# Patient Record
Sex: MALE | Race: WHITE | Employment: FULL TIME | URBAN - METROPOLITAN AREA
[De-identification: names, ages, dates, MRNs, and addresses within clinical notes are randomized per-mention and may not be internally consistent; named-entity substitution may affect disease eponyms.]

---

## 2024-07-14 ENCOUNTER — HOSPITAL ENCOUNTER (EMERGENCY)
Facility: HOSPITAL | Age: 44
Discharge: HOME | End: 2024-07-14
Attending: EMERGENCY MEDICINE
Payer: COMMERCIAL

## 2024-07-14 ENCOUNTER — APPOINTMENT (OUTPATIENT)
Dept: GENERAL RADIOLOGY | Facility: HOSPITAL | Age: 44
End: 2024-07-14
Payer: COMMERCIAL

## 2024-07-14 ENCOUNTER — APPOINTMENT (OUTPATIENT)
Dept: ULTRASOUND IMAGING | Facility: HOSPITAL | Age: 44
End: 2024-07-14
Attending: EMERGENCY MEDICINE
Payer: COMMERCIAL

## 2024-07-14 VITALS
DIASTOLIC BLOOD PRESSURE: 70 MMHG | OXYGEN SATURATION: 100 % | HEIGHT: 76 IN | HEART RATE: 57 BPM | SYSTOLIC BLOOD PRESSURE: 102 MMHG | RESPIRATION RATE: 18 BRPM | BODY MASS INDEX: 29.22 KG/M2 | WEIGHT: 240 LBS | TEMPERATURE: 98 F

## 2024-07-14 DIAGNOSIS — K80.20 CALCULUS OF GALLBLADDER WITHOUT CHOLECYSTITIS WITHOUT OBSTRUCTION: Primary | ICD-10-CM

## 2024-07-14 LAB
ALBUMIN SERPL-MCNC: 4.1 G/DL (ref 3.4–5)
ALBUMIN/GLOB SERPL: 1.1 {RATIO} (ref 1–2)
ALP LIVER SERPL-CCNC: 50 U/L
ALT SERPL-CCNC: 26 U/L
ANION GAP SERPL CALC-SCNC: 8 MMOL/L (ref 0–18)
AST SERPL-CCNC: 12 U/L (ref 15–37)
ATRIAL RATE: 60 BPM
ATRIAL RATE: 66 BPM
BASOPHILS # BLD AUTO: 0.03 X10(3) UL (ref 0–0.2)
BASOPHILS NFR BLD AUTO: 0.3 %
BILIRUB SERPL-MCNC: 0.4 MG/DL (ref 0.1–2)
BUN BLD-MCNC: 14 MG/DL (ref 9–23)
CALCIUM BLD-MCNC: 8.9 MG/DL (ref 8.5–10.1)
CHLORIDE SERPL-SCNC: 109 MMOL/L (ref 98–112)
CO2 SERPL-SCNC: 26 MMOL/L (ref 21–32)
CREAT BLD-MCNC: 1.21 MG/DL
EGFRCR SERPLBLD CKD-EPI 2021: 75 ML/MIN/1.73M2 (ref 60–?)
EOSINOPHIL # BLD AUTO: 0.15 X10(3) UL (ref 0–0.7)
EOSINOPHIL NFR BLD AUTO: 1.6 %
ERYTHROCYTE [DISTWIDTH] IN BLOOD BY AUTOMATED COUNT: 13.5 %
GLOBULIN PLAS-MCNC: 3.7 G/DL (ref 2.8–4.4)
GLUCOSE BLD-MCNC: 69 MG/DL (ref 70–99)
GLUCOSE BLD-MCNC: 90 MG/DL (ref 70–99)
HCT VFR BLD AUTO: 43.1 %
HGB BLD-MCNC: 13.9 G/DL
IMM GRANULOCYTES # BLD AUTO: 0.02 X10(3) UL (ref 0–1)
IMM GRANULOCYTES NFR BLD: 0.2 %
LIPASE SERPL-CCNC: 247 U/L (ref 13–75)
LYMPHOCYTES # BLD AUTO: 4.36 X10(3) UL (ref 1–4)
LYMPHOCYTES NFR BLD AUTO: 46.6 %
MCH RBC QN AUTO: 27.7 PG (ref 26–34)
MCHC RBC AUTO-ENTMCNC: 32.3 G/DL (ref 31–37)
MCV RBC AUTO: 86 FL
MONOCYTES # BLD AUTO: 0.84 X10(3) UL (ref 0.1–1)
MONOCYTES NFR BLD AUTO: 9 %
NEUTROPHILS # BLD AUTO: 3.96 X10 (3) UL (ref 1.5–7.7)
NEUTROPHILS # BLD AUTO: 3.96 X10(3) UL (ref 1.5–7.7)
NEUTROPHILS NFR BLD AUTO: 42.3 %
OSMOLALITY SERPL CALC.SUM OF ELEC: 295 MOSM/KG (ref 275–295)
P AXIS: 64 DEGREES
P AXIS: 70 DEGREES
P-R INTERVAL: 178 MS
P-R INTERVAL: 184 MS
PLATELET # BLD AUTO: 202 10(3)UL (ref 150–450)
POTASSIUM SERPL-SCNC: 4 MMOL/L (ref 3.5–5.1)
PROT SERPL-MCNC: 7.8 G/DL (ref 6.4–8.2)
Q-T INTERVAL: 408 MS
Q-T INTERVAL: 428 MS
QRS DURATION: 102 MS
QRS DURATION: 104 MS
QTC CALCULATION (BEZET): 408 MS
QTC CALCULATION (BEZET): 448 MS
R AXIS: 98 DEGREES
R AXIS: 99 DEGREES
RBC # BLD AUTO: 5.01 X10(6)UL
SODIUM SERPL-SCNC: 143 MMOL/L (ref 136–145)
T AXIS: 48 DEGREES
T AXIS: 53 DEGREES
TROPONIN I SERPL HS-MCNC: <3 NG/L
TROPONIN I SERPL HS-MCNC: <3 NG/L
VENTRICULAR RATE: 60 BPM
VENTRICULAR RATE: 66 BPM
WBC # BLD AUTO: 9.4 X10(3) UL (ref 4–11)

## 2024-07-14 PROCEDURE — 85025 COMPLETE CBC W/AUTO DIFF WBC: CPT | Performed by: EMERGENCY MEDICINE

## 2024-07-14 PROCEDURE — 82962 GLUCOSE BLOOD TEST: CPT

## 2024-07-14 PROCEDURE — 36415 COLL VENOUS BLD VENIPUNCTURE: CPT

## 2024-07-14 PROCEDURE — 99285 EMERGENCY DEPT VISIT HI MDM: CPT

## 2024-07-14 PROCEDURE — 71045 X-RAY EXAM CHEST 1 VIEW: CPT

## 2024-07-14 PROCEDURE — 84484 ASSAY OF TROPONIN QUANT: CPT | Performed by: EMERGENCY MEDICINE

## 2024-07-14 PROCEDURE — 93010 ELECTROCARDIOGRAM REPORT: CPT

## 2024-07-14 PROCEDURE — 84484 ASSAY OF TROPONIN QUANT: CPT

## 2024-07-14 PROCEDURE — 80053 COMPREHEN METABOLIC PANEL: CPT

## 2024-07-14 PROCEDURE — 83690 ASSAY OF LIPASE: CPT | Performed by: EMERGENCY MEDICINE

## 2024-07-14 PROCEDURE — 80053 COMPREHEN METABOLIC PANEL: CPT | Performed by: EMERGENCY MEDICINE

## 2024-07-14 PROCEDURE — 76700 US EXAM ABDOM COMPLETE: CPT | Performed by: EMERGENCY MEDICINE

## 2024-07-14 PROCEDURE — 85025 COMPLETE CBC W/AUTO DIFF WBC: CPT

## 2024-07-14 PROCEDURE — 93005 ELECTROCARDIOGRAM TRACING: CPT

## 2024-07-14 RX ORDER — MAGNESIUM HYDROXIDE/ALUMINUM HYDROXICE/SIMETHICONE 120; 1200; 1200 MG/30ML; MG/30ML; MG/30ML
30 SUSPENSION ORAL ONCE
Status: COMPLETED | OUTPATIENT
Start: 2024-07-14 | End: 2024-07-14

## 2024-07-14 RX ORDER — NITROGLYCERIN 0.4 MG/1
0.4 TABLET SUBLINGUAL ONCE
Status: COMPLETED | OUTPATIENT
Start: 2024-07-14 | End: 2024-07-14

## 2024-07-14 RX ORDER — ASPIRIN 81 MG/1
324 TABLET, CHEWABLE ORAL ONCE
Status: COMPLETED | OUTPATIENT
Start: 2024-07-14 | End: 2024-07-14

## 2024-07-14 NOTE — DISCHARGE INSTRUCTIONS
Clear liquids for the next 12 to 24 hours and advance diet to low-fat diet avoid fatty fried or greasy foods.  Follow-up with duly general surgery over the next couple of days call tomorrow morning.  If you have severe pain high fever or excessive vomiting you should return to the ER.

## 2024-07-14 NOTE — ED PROVIDER NOTES
Patient Seen in: [facility] Emergency Department      History     Chief Complaint   Patient presents with    Chest Pain Angina     Stated Complaint: chest pain    Subjective:   HPI    This is a 44-year-old male complaining of abdominal pain the patient points to his lower chest upper abdomen somewhat epigastric area this started about 2 hours before coming in he states he had some salmon last night but also had 3 alcoholic drinks.  Woke up with some mild discomfort was unable to go back to sleep.  Pain does not radiate anywhere is not worsening with deep breath no nausea vomiting diaphoresis he is also had some intermittent pain in his right upper arm over the past month or so does not seem to be related to this pain and is seem to be improving that seem to worsen with movement.  There is no numbness or tingling.  Patient is non-smoking denies hypertension diabetes hypercholesterolemia or family history of heart disease.    Objective:   Past Medical History:    Anosmia    1 month after concussion    Concussion    Dust allergy    HEMORRHOIDS              Past Surgical History:   Procedure Laterality Date    Eye surgery      lasik                Social History     Socioeconomic History    Marital status:    Tobacco Use    Smoking status: Never    Smokeless tobacco: Never   Vaping Use    Vaping status: Never Used   Substance and Sexual Activity    Alcohol use: Yes     Comment: rare, 1/month    Drug use: No    Sexual activity: Yes     Partners: Female              Review of Systems    Positive for stated Chief Complaint: Chest Pain Angina    Other systems are as noted in HPI.  Constitutional and vital signs reviewed.      All other systems reviewed and negative except as noted above.    Physical Exam     ED Triage Vitals   BP 07/14/24 0428 130/86   Pulse 07/14/24 0428 73   Resp 07/14/24 0428 18   Temp 07/14/24 0436 97.6 °F (36.4 °C)   Temp src 07/14/24 0436 Temporal   SpO2 07/14/24 0428 100 %   O2 Device  07/14/24 0608 None (Room air)       Current Vitals:   Vital Signs  BP: 102/70  Pulse: 57  Resp: 18  Temp: 97.6 °F (36.4 °C)  Temp src: Temporal  MAP (mmHg): 81    Oxygen Therapy  SpO2: 100 %  O2 Device: None (Room air)            Physical Exam    Alert and oriented ×3 in no acute distress.  HEENT exam within normal limits.  Neck supple without lymphadenopathy or JVD.  Lungs are clear to auscultation.  Cardiovascular exam regular rate and rhythm without murmurs.  Abdomen is soft and nontender without rebound or guarding.  Extremities no clubbing cyanosis or edema.  Skin there is no rash.  Neurologic exam is within normal limits no focal deficits.    ED Course     Labs Reviewed   COMP METABOLIC PANEL (14) - Abnormal; Notable for the following components:       Result Value    Glucose 69 (*)     AST 12 (*)     All other components within normal limits   LIPASE - Abnormal; Notable for the following components:    Lipase 247 (*)     All other components within normal limits   CBC W/ DIFFERENTIAL - Abnormal; Notable for the following components:    Lymphocyte Absolute 4.36 (*)     All other components within normal limits   TROPONIN I HIGH SENSITIVITY - Normal   TROPONIN I HIGH SENSITIVITY - Normal   POCT GLUCOSE - Normal   CBC WITH DIFFERENTIAL WITH PLATELET    Narrative:     The following orders were created for panel order CBC With Differential With Platelet.  Procedure                               Abnormality         Status                     ---------                               -----------         ------                     CBC W/ DIFFERENTIAL[[id number]]          Abnormal            Final result                 Please view results for these tests on the individual orders.   RAINBOW DRAW BLUE     EKG    Rate, intervals and axes as noted on EKG Report.  Rate: 60  Rhythm: Sinus Rhythm  Reading: Rightward axis low voltage QRS cannot rule out anterior infarct age undetermined this is an abnormal EKG         Repeat  EKG done at 6:12 AM shows a ventricular rate of 66 the right axis and interval reviewed there is a normal sinus rhythm rightward axis low voltage QRS cannot rule out anterior infarct age undetermined this is an abnormal EKG   On the chemistries glucose was 69 but on repeat Accu-Chek it was 90     US ABDOMEN COMPLETE (CPT=76700)    Result Date: 7/14/2024  CONCLUSION:   1. Cholelithiasis and gallbladder sludge without sonographic evidence of acute cholecystitis or biliary obstruction.  2. Mild hepatic steatosis.   3. Normal appearance of the kidneys.  Additional abdominal viscera and aorta/IVC obscured by regional bowel gas.   LOCATION:  [facility]    Dictated by (CST): Rodríguez Torrez MD on 7/14/2024 at 8:07 AM     Finalized by (CST): Rodríguez Torrez MD on 7/14/2024 at 8:10 AM       XR CHEST AP PORTABLE  (CPT=71045)    Result Date: 7/14/2024  CONCLUSION:   Normal cardiac and mediastinal contours.  No pulmonary edema or focal airspace consolidation.  The pleural spaces are clear.  Regional osseous structures are normal.    LOCATION:  [facility]      Dictated by (CST): Rodríguez Torrez MD on 7/14/2024 at 7:16 AM     Finalized by (CST): Rodríguez Torrez MD on 7/14/2024 at 7:17 AM      Images independently reviewed there is cholelithiasis mild hepatic steatosis there is no cholecystitis chest x-ray unremarkable.         MDM      Initial differential diagnosis includes but not limited to ACS pulmonary embolus pancreatitis cholelithiasis gastritis GERD musculoskeletal chest pain pleurisy    Patient has cholelithiasis appears well can be discharged home discussed with general surgery and Dr. Ryan Erazo and patient was given referral advised low-fat diet return if worse                               Medical Decision Making      Disposition and Plan     Clinical Impression:  1. Calculus of gallbladder without cholecystitis without obstruction         Disposition:  Discharge  7/14/2024  8:30 am    Follow-up:  Luis Ballard  [address] [facility]  [address]  [facility] [address]  [phone]    Follow up      King Erazo MD  [address]  [address]  [facility] [id number] [phone]    Follow up in 2 day(s)            Medications Prescribed:  Current Discharge Medication List

## 2024-11-01 ENCOUNTER — HOSPITAL ENCOUNTER (EMERGENCY)
Facility: HOSPITAL | Age: 44
Discharge: HOME | End: 2024-11-01
Attending: EMERGENCY MEDICINE
Payer: COMMERCIAL

## 2024-11-01 VITALS
HEART RATE: 49 BPM | HEIGHT: 75 IN | SYSTOLIC BLOOD PRESSURE: 107 MMHG | TEMPERATURE: 97 F | BODY MASS INDEX: 26.98 KG/M2 | RESPIRATION RATE: 13 BRPM | DIASTOLIC BLOOD PRESSURE: 66 MMHG | OXYGEN SATURATION: 100 % | WEIGHT: 217 LBS

## 2024-11-01 DIAGNOSIS — G89.18 POSTOPERATIVE PAIN: Primary | ICD-10-CM

## 2024-11-01 LAB
ALBUMIN SERPL-MCNC: 4.2 G/DL (ref 3.2–4.8)
ALBUMIN/GLOB SERPL: 1.3 {RATIO} (ref 1–2)
ALP LIVER SERPL-CCNC: 58 U/L
ALT SERPL-CCNC: 57 U/L
ANION GAP SERPL CALC-SCNC: 8 MMOL/L (ref 0–18)
AST SERPL-CCNC: 63 U/L (ref ?–34)
BASOPHILS # BLD AUTO: 0.01 X10(3) UL (ref 0–0.2)
BASOPHILS NFR BLD AUTO: 0.1 %
BILIRUB SERPL-MCNC: 0.6 MG/DL (ref 0.3–1.2)
BUN BLD-MCNC: 11 MG/DL (ref 9–23)
CALCIUM BLD-MCNC: 9.7 MG/DL (ref 8.7–10.4)
CHLORIDE SERPL-SCNC: 110 MMOL/L (ref 98–112)
CO2 SERPL-SCNC: 23 MMOL/L (ref 21–32)
CREAT BLD-MCNC: 1.1 MG/DL
EGFRCR SERPLBLD CKD-EPI 2021: 84 ML/MIN/1.73M2 (ref 60–?)
EOSINOPHIL # BLD AUTO: 0 X10(3) UL (ref 0–0.7)
EOSINOPHIL NFR BLD AUTO: 0 %
ERYTHROCYTE [DISTWIDTH] IN BLOOD BY AUTOMATED COUNT: 13.4 %
GLOBULIN PLAS-MCNC: 3.2 G/DL (ref 2–3.5)
GLUCOSE BLD-MCNC: 150 MG/DL (ref 70–99)
HCT VFR BLD AUTO: 42.8 %
HGB BLD-MCNC: 14 G/DL
IMM GRANULOCYTES # BLD AUTO: 0.04 X10(3) UL (ref 0–1)
IMM GRANULOCYTES NFR BLD: 0.3 %
LYMPHOCYTES # BLD AUTO: 0.61 X10(3) UL (ref 1–4)
LYMPHOCYTES NFR BLD AUTO: 5.2 %
MCH RBC QN AUTO: 27.5 PG (ref 26–34)
MCHC RBC AUTO-ENTMCNC: 32.7 G/DL (ref 31–37)
MCV RBC AUTO: 83.9 FL
MONOCYTES # BLD AUTO: 0.24 X10(3) UL (ref 0.1–1)
MONOCYTES NFR BLD AUTO: 2 %
NEUTROPHILS # BLD AUTO: 10.89 X10 (3) UL (ref 1.5–7.7)
NEUTROPHILS # BLD AUTO: 10.89 X10(3) UL (ref 1.5–7.7)
NEUTROPHILS NFR BLD AUTO: 92.4 %
OSMOLALITY SERPL CALC.SUM OF ELEC: 294 MOSM/KG (ref 275–295)
PLATELET # BLD AUTO: 175 10(3)UL (ref 150–450)
POTASSIUM SERPL-SCNC: 3.7 MMOL/L (ref 3.5–5.1)
PROT SERPL-MCNC: 7.4 G/DL (ref 5.7–8.2)
RBC # BLD AUTO: 5.1 X10(6)UL
SODIUM SERPL-SCNC: 141 MMOL/L (ref 136–145)
WBC # BLD AUTO: 11.8 X10(3) UL (ref 4–11)

## 2024-11-01 PROCEDURE — 93005 ELECTROCARDIOGRAM TRACING: CPT

## 2024-11-01 PROCEDURE — 80053 COMPREHEN METABOLIC PANEL: CPT

## 2024-11-01 PROCEDURE — 93010 ELECTROCARDIOGRAM REPORT: CPT

## 2024-11-01 PROCEDURE — 36415 COLL VENOUS BLD VENIPUNCTURE: CPT

## 2024-11-01 PROCEDURE — 80053 COMPREHEN METABOLIC PANEL: CPT | Performed by: EMERGENCY MEDICINE

## 2024-11-01 PROCEDURE — 99284 EMERGENCY DEPT VISIT MOD MDM: CPT

## 2024-11-01 PROCEDURE — 85025 COMPLETE CBC W/AUTO DIFF WBC: CPT | Performed by: EMERGENCY MEDICINE

## 2024-11-01 PROCEDURE — 85025 COMPLETE CBC W/AUTO DIFF WBC: CPT

## 2024-11-01 RX ORDER — TRAMADOL HYDROCHLORIDE 50 MG/1
TABLET ORAL EVERY 6 HOURS PRN
Qty: 20 TABLET | Refills: 0 | Status: SHIPPED | OUTPATIENT
Start: 2024-11-01 | End: 2024-11-06

## 2024-11-02 NOTE — ED PROVIDER NOTES
Patient Seen in: [facility] Emergency Department      History     Chief Complaint   Patient presents with    Abdomen/Flank Pain     Stated Complaint: surgery today dr bennett young's admission    Subjective:   HPI      44-year-old male complaint of abdominal pain the patient had cholecystectomy and hernia repair done earlier today finished about 2 PM.  However he remained in recovery until the  surgical center was closing at 5 he continued to have pain of 8 out of 10 did get some fentanyl he said when he had that he was nauseated and his heart rate dropped in the 30s which has happened to him in the past.  He did vomit and felt somewhat better upon arrival here his pain is improved significantly states in a 1 or 2 on a 10 scale this was in the right upper quadrant.    Objective:     Past Medical History:    Anosmia    1 month after concussion    Concussion    Dust allergy    HEMORRHOIDS              Past Surgical History:   Procedure Laterality Date    Eye surgery      lasik    Hernia repair      Removal gallbladder                  Social History     Socioeconomic History    Marital status:    Tobacco Use    Smoking status: Never    Smokeless tobacco: Never   Vaping Use    Vaping status: Never Used   Substance and Sexual Activity    Alcohol use: Yes     Comment: rare, 1/month    Drug use: No    Sexual activity: Yes     Partners: Female     Social Drivers of Health     Food Insecurity: No Food Insecurity (8/5/2024)    Received from [facility]    Food Insecurity     Currently or in the past 3 months, have you worried your food would run out before you had money to buy more?: No     In the past 12 months, have you run out of food or been unable to get more?: No   Transportation Needs: No Transportation Needs (8/5/2024)    Received from [facility]    Transportation Needs     Medical Transportation Needs?: No    Received from [facility]    Housing  Stability                  Physical Exam     ED Triage Vitals   BP 11/01/24 1919 109/76   Pulse 11/01/24 1919 (!) 44   Resp 11/01/24 1919 16   Temp 11/01/24 1919 96.9 °F (36.1 °C)   Temp src 11/01/24 1919 Temporal   SpO2 11/01/24 1919 100 %   O2 Device 11/01/24 2014 None (Room air)       Current Vitals:   Vital Signs  BP: 107/66  Pulse: (!) 49  Resp: 13  Temp: 96.9 °F (36.1 °C)  Temp src: Temporal  MAP (mmHg): 78    Oxygen Therapy  SpO2: 100 %  O2 Device: None (Room air)        Physical Exam  Patient is alert orient x 3 no acute distress HEENT exam within normal limits neck there is no lymphadenopathy or JVD lungs are clear cardiovascular exam shows regular rate and rhythm without murmurs abdomen soft there is mild right upper quadrant tenderness no masses guarding rebound extremities normal skin is no rash    ED Course     Labs Reviewed   COMP METABOLIC PANEL (14) - Abnormal; Notable for the following components:       Result Value    Glucose 150 (*)     AST 63 (*)     ALT 57 (*)     All other components within normal limits   CBC WITH DIFFERENTIAL WITH PLATELET - Abnormal; Notable for the following components:    WBC 11.8 (*)     Neutrophil Absolute Prelim 10.89 (*)     Neutrophil Absolute 10.89 (*)     Lymphocyte Absolute 0.61 (*)     All other components within normal limits   RAINBOW DRAW LAVENDER   RAINBOW DRAW LIGHT GREEN   RAINBOW DRAW BLUE     EKG    Rate, intervals and axes as noted on EKG Report.  Rate: 49  Rhythm: Sinus bradycardia  Reading: Sinus bradycardia rightward axis cannot rule out anterior infarct age undetermined this is an abnormal EKG              Abnormal Labs Reviewed   COMP METABOLIC PANEL (14) - Abnormal; Notable for the following components:       Result Value    Glucose 150 (*)     AST 63 (*)     ALT 57 (*)     All other components within normal limits   CBC WITH DIFFERENTIAL WITH PLATELET - Abnormal; Notable for the following components:    WBC 11.8 (*)     Neutrophil Absolute Prelim  10.89 (*)     Neutrophil Absolute 10.89 (*)     Lymphocyte Absolute 0.61 (*)     All other components within normal limits     Labs unremarkable.           MDM      Initial differential diagnosis considered not limited to includes postoperative pain, perforation, postoperative hemorrhage vasovagal episode        Medical Decision Making    Patient describes that his heart rate dropped to 30 when he was getting fentanyl but also was quite nauseated at that time I believe is probable vasovagal episode.  Is pain seems to have improved significantly Case discussed with general surgery and the patient appears well to go home advised return if worse he has had cardiac workup regarding slow heart in the past.  D    Clinical Impression:  1. Postoperative pain         Disposition:  Discharge  11/1/2024  9:11 pm    Follow-up:  Luis Ballard DO  [address] [facility] [facility] [address]  [facility] [id number] [phone]    Follow up      Franklyn Martinez MD  [address] [facility] [address]  [facility] [id number] [phone]    Follow up in 3 day(s)            Medications Prescribed:  Discharge Medication List as of 11/1/2024  9:21 PM        START taking these medications    Details   traMADol 50 MG Oral Tab Take 1-2 tablets ( mg total) by mouth every 6 (six) hours as needed for Pain., Normal, Disp-20 tablet, R-0                 Supplementary Documentation:

## 2024-11-02 NOTE — DISCHARGE INSTRUCTIONS
Follow-up with general surgery Monday return for worsening symptoms take the Norco for pain as needed.

## 2024-11-02 NOTE — ED INITIAL ASSESSMENT (HPI)
Pt arrives to ED for gallbladder removal and hernia repair today, pt was in a lot of pain post op, surgery center said it was unusual to have so much pain, surgery center closed, pt sent here for further evaluation. Pt having some n/v post op. Pt given Tramadol and Zofran at 1630 and has not been n/v since.

## 2024-11-04 LAB
ATRIAL RATE: 49 BPM
P AXIS: 77 DEGREES
P-R INTERVAL: 170 MS
Q-T INTERVAL: 458 MS
QRS DURATION: 108 MS
QTC CALCULATION (BEZET): 413 MS
R AXIS: 102 DEGREES
T AXIS: 76 DEGREES
VENTRICULAR RATE: 49 BPM